# Patient Record
Sex: FEMALE | Race: WHITE | NOT HISPANIC OR LATINO | Employment: OTHER | URBAN - METROPOLITAN AREA
[De-identification: names, ages, dates, MRNs, and addresses within clinical notes are randomized per-mention and may not be internally consistent; named-entity substitution may affect disease eponyms.]

---

## 2023-03-24 ENCOUNTER — TELEPHONE (OUTPATIENT)
Dept: PRIMARY CARE | Facility: CLINIC | Age: 53
End: 2023-03-24
Payer: COMMERCIAL

## 2023-03-27 DIAGNOSIS — J01.10 ACUTE FRONTAL SINUSITIS, RECURRENCE NOT SPECIFIED: Primary | ICD-10-CM

## 2023-03-27 RX ORDER — AZITHROMYCIN 250 MG/1
TABLET, FILM COATED ORAL
Qty: 6 TABLET | Refills: 0 | Status: SHIPPED | OUTPATIENT
Start: 2023-03-27 | End: 2023-04-01

## 2023-04-24 NOTE — PROGRESS NOTES
Subjective   Angelia Shah is a 52 y.o. female who presents for Concerned of Fluctuating blood work   .  HPI  Adore is a 52-year-old female known history of benign essential hypertension type 2 diabetes mellitus diet-controlled, dyslipidemia anxiety Parul-Barr virus patient is here for follow-up, she takes her medication prescribed aspirin 80 mg denies chest pain shortness of breath fever chills nausea vomiting constipation diarrhea concerned fluctuating blood sugar  gained 15 lbs.   Review of Systems  10 system review pertinent as above  Objective     There were no vitals taken for this visit.   Physical Exam    HEENT: Atraumatic normocephalic the pupils are equal and round and reactive to light the sclerae nonicteric extraocular motion are intact.  Neck: Is supple without JVD no carotid bruits the trachea is midline there are no masses pulses are equal and bilateral with normal upstroke.  Skin: Normal.  Skin good texture.  Moist.  Good turgor.  No lesions, no rashes.  Lymph: No lymphadenopathy appreciated, no masses, no lesions  Lungs: Are clear to auscultation and percussion, good breath sounds bilaterally, no rhonchi, no wheezing, good diaphragmatic excursion.  Heart: Normal rate and normal rhythm S1, S2, no S3, no gallop, murmur or rub.  Abdomen: Soft, nontender, no organomegaly, good bowel sounds.    Extremities: Full range of motion, good pulses bilateral.  No cyanosis, no clubbing or edema.  Neuro: Cranial nerves II-XII are grossly intact there is no sensory or motor deficits.  Able to move all extremities.    Assessment/Plan     Type 2 diabetes  Out of control    Fasting blood work    CBC BMP lipids  A1c    Allergic to PCN    GYN  Follow Pap  Mammogram  03/02/23 normal   Colonoscopy , Req given Dr Hyatt    IBS   With constioation  Fluids rest  Linzess    GERD  Stable    Avoid spicy food  Wt loss worked    EBV   Stable    HTN Stable with wt loss    Tobacco abuse declined to quit     Cholesterol  lipid panel    Ankylosis spondylosis  Patient is very active       Problem List Items Addressed This Visit          Endocrine/Metabolic    Type 2 diabetes mellitus without complication, without long-term current use of insulin (CMS/[facility])    Relevant Orders    Basic Metabolic Panel    Hemoglobin A1C       Other    Dyslipidemia - Primary    Relevant Orders    Basic Metabolic Panel    Hemoglobin A1C    Lipid Panel    Tiredness         Terry Perez MD

## 2023-04-26 ENCOUNTER — OFFICE VISIT (OUTPATIENT)
Dept: PRIMARY CARE | Facility: CLINIC | Age: 53
End: 2023-04-26
Payer: COMMERCIAL

## 2023-04-26 VITALS
RESPIRATION RATE: 16 BRPM | BODY MASS INDEX: 25.92 KG/M2 | TEMPERATURE: 97.7 F | DIASTOLIC BLOOD PRESSURE: 84 MMHG | SYSTOLIC BLOOD PRESSURE: 124 MMHG | WEIGHT: 175 LBS | HEIGHT: 69 IN | HEART RATE: 78 BPM

## 2023-04-26 DIAGNOSIS — E11.9 TYPE 2 DIABETES MELLITUS WITHOUT COMPLICATION, WITHOUT LONG-TERM CURRENT USE OF INSULIN (MULTI): ICD-10-CM

## 2023-04-26 DIAGNOSIS — Z72.0 TOBACCO ABUSE: ICD-10-CM

## 2023-04-26 DIAGNOSIS — R53.83 TIREDNESS: ICD-10-CM

## 2023-04-26 DIAGNOSIS — E78.5 DYSLIPIDEMIA: Primary | ICD-10-CM

## 2023-04-26 DIAGNOSIS — Z12.11 SCREENING FOR COLON CANCER: ICD-10-CM

## 2023-04-26 PROCEDURE — 3074F SYST BP LT 130 MM HG: CPT | Performed by: INTERNAL MEDICINE

## 2023-04-26 PROCEDURE — 99214 OFFICE O/P EST MOD 30 MIN: CPT | Performed by: INTERNAL MEDICINE

## 2023-04-26 PROCEDURE — 80048 BASIC METABOLIC PNL TOTAL CA: CPT | Performed by: INTERNAL MEDICINE

## 2023-04-26 PROCEDURE — 80061 LIPID PANEL: CPT | Performed by: INTERNAL MEDICINE

## 2023-04-26 PROCEDURE — 3079F DIAST BP 80-89 MM HG: CPT | Performed by: INTERNAL MEDICINE

## 2023-04-26 PROCEDURE — 4004F PT TOBACCO SCREEN RCVD TLK: CPT | Performed by: INTERNAL MEDICINE

## 2023-04-26 PROCEDURE — 83036 HEMOGLOBIN GLYCOSYLATED A1C: CPT | Performed by: INTERNAL MEDICINE

## 2023-04-26 PROCEDURE — 36415 COLL VENOUS BLD VENIPUNCTURE: CPT | Performed by: INTERNAL MEDICINE

## 2023-04-26 RX ORDER — ROSUVASTATIN CALCIUM 20 MG/1
1 TABLET, COATED ORAL DAILY
COMMUNITY
Start: 2021-06-14 | End: 2023-04-28

## 2023-04-26 RX ORDER — TIRZEPATIDE 5 MG/.5ML
5 INJECTION, SOLUTION SUBCUTANEOUS
Qty: 3 ML | Refills: 2 | Status: SHIPPED | OUTPATIENT
Start: 2023-04-26 | End: 2023-04-28

## 2023-04-26 ASSESSMENT — PAIN SCALES - GENERAL: PAINLEVEL: 0-NO PAIN

## 2023-04-27 DIAGNOSIS — E11.9 TYPE 2 DIABETES MELLITUS WITHOUT COMPLICATION, WITHOUT LONG-TERM CURRENT USE OF INSULIN (MULTI): ICD-10-CM

## 2023-04-27 DIAGNOSIS — E78.5 DYSLIPIDEMIA: Primary | ICD-10-CM

## 2023-04-28 DIAGNOSIS — E11.9 TYPE 2 DIABETES MELLITUS WITHOUT COMPLICATION, WITHOUT LONG-TERM CURRENT USE OF INSULIN (MULTI): ICD-10-CM

## 2023-04-28 RX ORDER — ROSUVASTATIN CALCIUM 20 MG/1
TABLET, COATED ORAL DAILY
Qty: 90 TABLET | Refills: 3 | Status: SHIPPED | OUTPATIENT
Start: 2023-04-28 | End: 2023-05-08 | Stop reason: SDUPTHER

## 2023-04-28 RX ORDER — TIRZEPATIDE 5 MG/.5ML
5 INJECTION, SOLUTION SUBCUTANEOUS
Qty: 3 ML | Refills: 1 | Status: SHIPPED | OUTPATIENT
Start: 2023-04-28 | End: 2023-04-28 | Stop reason: SDUPTHER

## 2023-04-30 RX ORDER — TIRZEPATIDE 5 MG/.5ML
5 INJECTION, SOLUTION SUBCUTANEOUS
Qty: 3 ML | Refills: 1 | Status: SHIPPED | OUTPATIENT
Start: 2023-04-30 | End: 2023-05-08 | Stop reason: SDUPTHER

## 2023-05-08 DIAGNOSIS — E78.5 DYSLIPIDEMIA: ICD-10-CM

## 2023-05-08 DIAGNOSIS — E11.9 TYPE 2 DIABETES MELLITUS WITHOUT COMPLICATION, WITHOUT LONG-TERM CURRENT USE OF INSULIN (MULTI): ICD-10-CM

## 2023-05-08 DIAGNOSIS — Z72.0 TOBACCO ABUSE: Primary | ICD-10-CM

## 2023-05-08 RX ORDER — IBUPROFEN 200 MG
1 TABLET ORAL EVERY 24 HOURS
Qty: 30 PATCH | Refills: 0 | Status: SHIPPED | OUTPATIENT
Start: 2023-05-08 | End: 2023-06-07

## 2023-05-08 RX ORDER — TIRZEPATIDE 5 MG/.5ML
5 INJECTION, SOLUTION SUBCUTANEOUS
Qty: 3 ML | Refills: 1 | Status: SHIPPED | OUTPATIENT
Start: 2023-05-08 | End: 2023-05-09 | Stop reason: SDUPTHER

## 2023-05-08 RX ORDER — ROSUVASTATIN CALCIUM 20 MG/1
20 TABLET, COATED ORAL DAILY
Qty: 90 TABLET | Refills: 3 | Status: SHIPPED | OUTPATIENT
Start: 2023-05-08 | End: 2023-07-07 | Stop reason: SDUPTHER

## 2023-05-08 NOTE — PROGRESS NOTES
I have given refills to Adore for Mounjaro rosuvastatin and NicoDerm patch  Also give refill for her  for NicoDerm patch.

## 2023-05-09 ENCOUNTER — TELEPHONE (OUTPATIENT)
Dept: PRIMARY CARE | Facility: CLINIC | Age: 53
End: 2023-05-09

## 2023-05-09 DIAGNOSIS — E11.9 TYPE 2 DIABETES MELLITUS WITHOUT COMPLICATION, WITHOUT LONG-TERM CURRENT USE OF INSULIN (MULTI): ICD-10-CM

## 2023-05-09 DIAGNOSIS — E78.5 DYSLIPIDEMIA: ICD-10-CM

## 2023-05-09 RX ORDER — DULOXETIN HYDROCHLORIDE 30 MG/1
1 CAPSULE, DELAYED RELEASE ORAL DAILY
COMMUNITY
Start: 2022-08-11 | End: 2023-07-07 | Stop reason: SDUPTHER

## 2023-05-09 RX ORDER — TIRZEPATIDE 5 MG/.5ML
5 INJECTION, SOLUTION SUBCUTANEOUS
Qty: 3 ML | Refills: 1 | Status: SHIPPED | OUTPATIENT
Start: 2023-05-09 | End: 2023-07-07 | Stop reason: ALTCHOICE

## 2023-05-10 RX ORDER — METFORMIN HYDROCHLORIDE 500 MG/1
500 TABLET ORAL
Qty: 60 TABLET | Refills: 11 | Status: SHIPPED | OUTPATIENT
Start: 2023-05-10 | End: 2023-07-07 | Stop reason: SINTOL

## 2023-05-10 NOTE — PROGRESS NOTES
I called patient left a message, insurance instructed patient starts metformin first prior to proceeding with any preauthorization for  Mounjaro.   uti

## 2023-07-07 ENCOUNTER — OFFICE VISIT (OUTPATIENT)
Dept: PRIMARY CARE | Facility: CLINIC | Age: 53
End: 2023-07-07
Payer: COMMERCIAL

## 2023-07-07 VITALS
TEMPERATURE: 98.4 F | RESPIRATION RATE: 15 BRPM | HEART RATE: 74 BPM | WEIGHT: 175 LBS | HEIGHT: 69 IN | BODY MASS INDEX: 25.92 KG/M2 | DIASTOLIC BLOOD PRESSURE: 80 MMHG | SYSTOLIC BLOOD PRESSURE: 120 MMHG

## 2023-07-07 DIAGNOSIS — M79.7 CHRONIC FATIGUE SYNDROME WITH FIBROMYALGIA: ICD-10-CM

## 2023-07-07 DIAGNOSIS — G93.32 CHRONIC FATIGUE SYNDROME WITH FIBROMYALGIA: ICD-10-CM

## 2023-07-07 DIAGNOSIS — E78.5 DYSLIPIDEMIA: ICD-10-CM

## 2023-07-07 DIAGNOSIS — E11.9 TYPE 2 DIABETES MELLITUS WITHOUT COMPLICATION, WITHOUT LONG-TERM CURRENT USE OF INSULIN (MULTI): ICD-10-CM

## 2023-07-07 DIAGNOSIS — B37.31 VAGINAL YEAST INFECTION: ICD-10-CM

## 2023-07-07 DIAGNOSIS — G47.00 INSOMNIA, UNSPECIFIED TYPE: ICD-10-CM

## 2023-07-07 DIAGNOSIS — Z72.0 TOBACCO ABUSE: ICD-10-CM

## 2023-07-07 DIAGNOSIS — E11.9 TYPE 2 DIABETES MELLITUS WITHOUT COMPLICATION, WITHOUT LONG-TERM CURRENT USE OF INSULIN (MULTI): Primary | ICD-10-CM
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
## 2023-07-07 DIAGNOSIS — S00.06XA TICK BITE OF SCALP, INITIAL ENCOUNTER: ICD-10-CM

## 2023-07-07 PROCEDURE — 3079F DIAST BP 80-89 MM HG: CPT | Performed by: INTERNAL MEDICINE

## 2023-07-07 PROCEDURE — 3074F SYST BP LT 130 MM HG: CPT | Performed by: INTERNAL MEDICINE

## 2023-07-07 PROCEDURE — 99214 OFFICE O/P EST MOD 30 MIN: CPT | Performed by: INTERNAL MEDICINE

## 2023-07-07 PROCEDURE — 4004F PT TOBACCO SCREEN RCVD TLK: CPT | Performed by: INTERNAL MEDICINE

## 2023-07-07 PROCEDURE — 85025 COMPLETE CBC W/AUTO DIFF WBC: CPT | Performed by: INTERNAL MEDICINE

## 2023-07-07 PROCEDURE — 87476 LYME DIS DNA AMP PROBE: CPT

## 2023-07-07 PROCEDURE — 80048 BASIC METABOLIC PNL TOTAL CA: CPT | Performed by: INTERNAL MEDICINE

## 2023-07-07 PROCEDURE — 83036 HEMOGLOBIN GLYCOSYLATED A1C: CPT | Performed by: INTERNAL MEDICINE

## 2023-07-07 RX ORDER — FLUCONAZOLE 100 MG/1
100 TABLET ORAL DAILY
Qty: 4 TABLET | Refills: 0 | Status: SHIPPED | OUTPATIENT
Start: 2023-07-07 | End: 2023-07-11

## 2023-07-07 RX ORDER — TRAZODONE HYDROCHLORIDE 50 MG/1
50 TABLET ORAL NIGHTLY PRN
Qty: 30 TABLET | Refills: 0 | Status: SHIPPED | OUTPATIENT
Start: 2023-07-07 | End: 2024-06-03 | Stop reason: WASHOUT

## 2023-07-07 RX ORDER — ROSUVASTATIN CALCIUM 20 MG/1
20 TABLET, COATED ORAL DAILY
Qty: 90 TABLET | Refills: 3 | Status: SHIPPED | OUTPATIENT
Start: 2023-07-07 | End: 2024-05-22

## 2023-07-07 RX ORDER — DOXYCYCLINE 100 MG/1
100 CAPSULE ORAL 2 TIMES DAILY
Qty: 20 CAPSULE | Refills: 0 | Status: SHIPPED | OUTPATIENT
Start: 2023-07-07 | End: 2023-07-17

## 2023-07-07 RX ORDER — TIRZEPATIDE 2.5 MG/.5ML
2.5 INJECTION, SOLUTION SUBCUTANEOUS
Qty: 3 ML | Refills: 0 | Status: SHIPPED | OUTPATIENT
Start: 2023-07-07 | End: 2024-01-22 | Stop reason: SDUPTHER

## 2023-07-07 RX ORDER — TIRZEPATIDE 2.5 MG/.5ML
2.5 INJECTION, SOLUTION SUBCUTANEOUS
Qty: 3 ML | Refills: 0 | Status: SHIPPED | OUTPATIENT
Start: 2023-07-07 | End: 2023-07-07 | Stop reason: ALTCHOICE

## 2023-07-07 RX ORDER — DULOXETIN HYDROCHLORIDE 30 MG/1
30 CAPSULE, DELAYED RELEASE ORAL DAILY
Qty: 30 CAPSULE | Refills: 3 | Status: SHIPPED | OUTPATIENT
Start: 2023-07-07 | End: 2023-08-23 | Stop reason: ALTCHOICE

## 2023-07-07 ASSESSMENT — PAIN SCALES - GENERAL: PAINLEVEL: 0-NO PAIN

## 2023-07-07 NOTE — PROGRESS NOTES
Subjective   Angelia Shah is a 52 y.o. female who presents for patient is here tick bite.  ERICKSON Avitia is a 52-year-old female history of type 2 diabetes anxiety and depression dyslipidemia takes medication prescribed for patient is here complaining of a tick bite. In addition patient is on metformin unable to take due Diarrhea and upset stomach.   Review of Systems  10 system review pertinent as above  Objective     There were no vitals taken for this visit.   Physical Exam  HEENT: Atraumatic normocephalic the pupils are equal and round and reactive to light the sclerae nonicteric extraocular motion are intact.  Neck: Is supple without JVD no carotid bruits the trachea is midline there are no masses pulses are equal and bilateral with normal upstroke.  Skin: Normal.  Skin good texture.  Moist.  Good turgor.  No lesions, no rashes.  Lymph: No lymphadenopathy appreciated, no masses, no lesions  Lungs: Are clear to auscultation and percussion, good breath sounds bilaterally, no rhonchi, no wheezing, good diaphragmatic excursion.  Heart: Normal rate and normal rhythm S1, S2, no S3, no gallop, murmur or rub.  Abdomen: Soft, nontender, no organomegaly, good bowel sounds.    Extremities: Full range of motion, good pulses bilateral.  No cyanosis, no clubbing or edema.  Neuro: Cranial nerves II-XII are grossly intact there is no sensory or motor deficits.  Able to move all extremities.      Assessment/Plan     Patient is here with a tick bite  Two saturdays a go  Left side of head    Continue with the low-fat, low-cholesterol diet,  I recommended Mediterranean diet, which include fish, chicken, vegetables and olive oil  Exercise daily for 30 minutes at least 3 times a week  Continue home medications    Diabetes  Continue your current medications  Remain on a Low carbohydrate diet, exercise daily  Check a sugar before breakfast and before dinner  Failed Metformin due to N/V Diarrhea Abdominal pain  Will try  Mounjaro    Chronic pain syndrome  Doing well on Cymbalta  Stopped will continue    Insomnia tried melatonin  Will try trazodone    Problem List Items Addressed This Visit       Dyslipidemia    Relevant Medications    rosuvastatin (Crestor) 20 mg tablet    Type 2 diabetes mellitus without complication, without long-term current use of insulin (CMS/[facility]) - Primary    Relevant Medications    tirzepatide (Mounjaro) 2.5 mg/0.5 mL pen injector    rosuvastatin (Crestor) 20 mg tablet    Other Relevant Orders    Basic Metabolic Panel    Basic Metabolic Panel    Hemoglobin A1C    Tobacco abuse    Chronic fatigue syndrome with fibromyalgia    Relevant Medications    DULoxetine (Cymbalta) 30 mg DR capsule    Tick bite of scalp    Relevant Medications    doxycycline (Monodox) 100 mg capsule    Other Relevant Orders    Lyme disease, PCR    CBC    Insomnia    Relevant Medications    traZODone (Desyrel) 50 mg tablet    Vaginal yeast infection    Relevant Medications    fluconazole (Diflucan) 100 mg tablet         Terry Perez MD

## 2023-07-12 LAB — LYME DISEASE (BORRELIA BURGDORFERI), PCR: NOT DETECTED

## 2023-08-11 ENCOUNTER — TELEPHONE (OUTPATIENT)
Dept: PRIMARY CARE | Facility: CLINIC | Age: 53
End: 2023-08-11

## 2023-08-21 NOTE — PROGRESS NOTES
Subjective   Angelia Shah is a 52 y.o. female who presents for back pain.  HPI  Angelia is a 52-year-old female chronic fatigue Parul-Barr virus dyslipidemia type 2 diabetes chronic pain syndrome on Cymbalta insomnia on melatonin and trazodone complaint lower back pain history of sciatica pain is radiating to buttock and leg, patient tried over-the-counter medication without success  Review of Systems  10 systems are pertinent as above  Objective     There were no vitals taken for this visit.   Physical Exam  HEENT: Atraumatic normocephalic the pupils are equal and round and reactive to light the sclerae nonicteric extraocular motion are intact.  Neck: Is supple without JVD no carotid bruits the trachea is midline there are no masses pulses are equal and bilateral with normal upstroke.  Skin: Normal.  Skin good texture.  Moist.  Good turgor.  No lesions, no rashes.  Lymph: No lymphadenopathy appreciated, no masses, no lesions  Lungs: Are clear to auscultation and percussion, good breath sounds bilaterally, no rhonchi, no wheezing, good diaphragmatic excursion.  Heart: Normal rate and normal rhythm S1, S2, no S3, no gallop, murmur or rub.  Abdomen: Soft, nontender, no organomegaly, good bowel sounds.    Extremities: Full range of motion, good pulses bilateral.  No cyanosis, no clubbing or edema.  Neuro: Cranial nerves II-XII are grossly intact there is no sensory or motor deficits.  Able to move all extremities.      Assessment/Plan     Here for a follow-up complaining of back pain    Acute sciatica  With lumbar radicular  X-ray LS-spine ordered  [facility]  Physical therapy  Core exercise    Diabetes  Continue your current medications  Remain on a Low carbohydrate diet, exercise daily  Check a sugar before breakfast and before dinner    Low-fat, low-cholesterol diet.  I recommended Mediterranean diet  Including fish, chicken, vegetables and olive oil  Exercise daily for 30 minutes  Continue medications as  prescribed    Otalgia  Cortisporin otic solution  Medrol dos paqck      Problem List Items Addressed This Visit    None        Terry Perez MD

## 2023-08-23 ENCOUNTER — OFFICE VISIT (OUTPATIENT)
Dept: PRIMARY CARE | Facility: CLINIC | Age: 53
End: 2023-08-23
Payer: COMMERCIAL

## 2023-08-23 VITALS — BODY MASS INDEX: 25.84 KG/M2 | HEIGHT: 69 IN

## 2023-08-23 DIAGNOSIS — M54.41 LOW BACK PAIN WITH BILATERAL SCIATICA, UNSPECIFIED BACK PAIN LATERALITY, UNSPECIFIED CHRONICITY: Primary | ICD-10-CM

## 2023-08-23 DIAGNOSIS — H92.03 OTALGIA OF BOTH EARS: ICD-10-CM

## 2023-08-23 DIAGNOSIS — M54.42 LOW BACK PAIN WITH BILATERAL SCIATICA, UNSPECIFIED BACK PAIN LATERALITY, UNSPECIFIED CHRONICITY: Primary | ICD-10-CM

## 2023-08-23 PROCEDURE — 99214 OFFICE O/P EST MOD 30 MIN: CPT | Performed by: INTERNAL MEDICINE

## 2023-08-23 PROCEDURE — 4004F PT TOBACCO SCREEN RCVD TLK: CPT | Performed by: INTERNAL MEDICINE

## 2023-08-23 RX ORDER — NEOMYCIN SULFATE, POLYMYXIN B SULFATE, HYDROCORTISONE 3.5; 10000; 1 MG/ML; [USP'U]/ML; MG/ML
2 SOLUTION/ DROPS AURICULAR (OTIC) 3 TIMES DAILY
Qty: 10 ML | Refills: 0 | Status: SHIPPED | OUTPATIENT
Start: 2023-08-23 | End: 2024-08-22

## 2023-08-23 RX ORDER — METHYLPREDNISOLONE 4 MG/1
TABLET ORAL
Qty: 21 TABLET | Refills: 0 | Status: SHIPPED | OUTPATIENT
Start: 2023-08-23 | End: 2023-08-30

## 2023-10-12 ENCOUNTER — ANCILLARY PROCEDURE (OUTPATIENT)
Dept: RADIOLOGY | Facility: CLINIC | Age: 53
End: 2023-10-12
Payer: COMMERCIAL

## 2023-10-12 DIAGNOSIS — S92.901D UNSPECIFIED FRACTURE OF RIGHT FOOT, SUBSEQUENT ENCOUNTER FOR FRACTURE WITH ROUTINE HEALING: ICD-10-CM

## 2023-10-12 DIAGNOSIS — Z98.890 OTHER SPECIFIED POSTPROCEDURAL STATES: ICD-10-CM

## 2023-10-12 PROCEDURE — 73630 X-RAY EXAM OF FOOT: CPT | Mod: RIGHT SIDE | Performed by: RADIOLOGY

## 2023-10-12 PROCEDURE — 73630 X-RAY EXAM OF FOOT: CPT | Mod: RT,FY

## 2023-12-08 PROCEDURE — 88305 TISSUE EXAM BY PATHOLOGIST: CPT | Performed by: PATHOLOGY

## 2023-12-08 PROCEDURE — 88305 TISSUE EXAM BY PATHOLOGIST: CPT

## 2023-12-11 ENCOUNTER — LAB REQUISITION (OUTPATIENT)
Dept: LAB | Facility: HOSPITAL | Age: 53
End: 2023-12-11
Payer: COMMERCIAL

## 2023-12-11 DIAGNOSIS — N89.8 OTHER SPECIFIED NONINFLAMMATORY DISORDERS OF VAGINA: ICD-10-CM

## 2023-12-13 LAB
LABORATORY COMMENT REPORT: NORMAL
PATH REPORT.FINAL DX SPEC: NORMAL
PATH REPORT.GROSS SPEC: NORMAL
PATH REPORT.RELEVANT HX SPEC: NORMAL
PATH REPORT.TOTAL CANCER: NORMAL

## 2023-12-27 ENCOUNTER — HOSPITAL ENCOUNTER (OUTPATIENT)
Dept: RADIOLOGY | Facility: HOSPITAL | Age: 53
Discharge: HOME | End: 2023-12-27
Payer: COMMERCIAL

## 2023-12-27 DIAGNOSIS — R23.4 CHANGES IN SKIN TEXTURE: ICD-10-CM

## 2023-12-27 PROCEDURE — 77066 DX MAMMO INCL CAD BI: CPT | Performed by: RADIOLOGY

## 2023-12-27 PROCEDURE — 76642 ULTRASOUND BREAST LIMITED: CPT | Performed by: RADIOLOGY

## 2023-12-27 PROCEDURE — 76642 ULTRASOUND BREAST LIMITED: CPT | Mod: 50

## 2023-12-27 PROCEDURE — 77066 DX MAMMO INCL CAD BI: CPT

## 2023-12-27 PROCEDURE — 77062 BREAST TOMOSYNTHESIS BI: CPT | Performed by: RADIOLOGY

## 2024-01-22 ENCOUNTER — TELEPHONE (OUTPATIENT)
Dept: PRIMARY CARE | Facility: CLINIC | Age: 54
End: 2024-01-22
Payer: COMMERCIAL

## 2024-01-22 DIAGNOSIS — E11.9 TYPE 2 DIABETES MELLITUS WITHOUT COMPLICATION, WITHOUT LONG-TERM CURRENT USE OF INSULIN (MULTI): ICD-10-CM

## 2024-01-22 RX ORDER — TIRZEPATIDE 2.5 MG/.5ML
2.5 INJECTION, SOLUTION SUBCUTANEOUS
Qty: 6 ML | Refills: 3 | Status: SHIPPED | OUTPATIENT
Start: 2024-01-22 | End: 2024-02-20 | Stop reason: ALTCHOICE

## 2024-01-22 RX ORDER — TIRZEPATIDE 2.5 MG/.5ML
2.5 INJECTION, SOLUTION SUBCUTANEOUS
Qty: 3 ML | Refills: 0 | Status: SHIPPED | OUTPATIENT
Start: 2024-01-22 | End: 2024-02-20 | Stop reason: ALTCHOICE

## 2024-01-22 NOTE — TELEPHONE ENCOUNTER
Pt states that she spoke with her insurance company and she was told Mounjaro has been approved for her, she need a 90 day supple to go to the [facility] and a 30 day supply to go to her [facility]

## 2024-02-14 NOTE — PROGRESS NOTES
Subjective   Angelia Shah is a 53 y.o. female who presents for back pain.  HPI  Angelia is a 52-year-old female chronic fatigue Parul-Barr virus dyslipidemia type 2 diabetes chronic pain syndrome on Cymbalta insomnia on melatonin and trazodone complaint lower back pain history of sciatica pain is radiating to buttock and leg, patient tried over-the-counter medication without success Patient couldn't tolerate Metformin due to diarrhea.  Foot pain severe lateral was seen by podiatry and wants a second opinion.   Review of Systems  10 systems are pertinent as above  Objective     Visit Vitals  /80   Pulse 72   Temp 36.3 °C (97.3 °F)   Resp 16      Physical Exam  HEENT: Atraumatic normocephalic the pupils are equal and round and reactive to light the sclerae nonicteric extraocular motion are intact.  Neck: Is supple without JVD no carotid bruits the trachea is midline there are no masses pulses are equal and bilateral with normal upstroke.  Skin: Normal.  Skin good texture.  Moist.  Good turgor.  No lesions, no rashes.  Lymph: No lymphadenopathy appreciated, no masses, no lesions  Lungs: Are clear to auscultation and percussion, good breath sounds bilaterally, no rhonchi, no wheezing, good diaphragmatic excursion.  Heart: Normal rate and normal rhythm S1, S2, no S3, no gallop, murmur or rub.  Abdomen: Soft, nontender, no organomegaly, good bowel sounds.    Extremities: Full range of motion, good pulses bilateral.  No cyanosis, no clubbing or edema.  Neuro: Cranial nerves II-XII are grossly intact there is no sensory or motor deficits.  Able to move all extremities.      Assessment/Plan     Physical therapy    CBC BMP LIPID AST ALT a1c    Colonoscopy declined wants Cologurd  Mammogram  GYN Dr Pelaez      Here for a follow-up complaining of back pain    Acute sciatica  With lumbar radicular  X-ray LS-spine ordered  Medrol Dosepak  Physical therapy  Core exercise    Diabetes  Continue your current  medications  Remain on a Low carbohydrate diet, exercise daily  Check a sugar before breakfast and before dinner    Low-fat, low-cholesterol diet.  I recommended Mediterranean diet  Including fish, chicken, vegetables and olive oil  Exercise daily for 30 minutes  Continue medications as prescribed    Otalgia  Cortisporin otic solution  Medrol dos paqck      Problem List Items Addressed This Visit       Dyslipidemia    Relevant Orders    Lipid Panel    AST    ALT    Type 2 diabetes mellitus without complication, without long-term current use of insulin (CMS/[facility])    Relevant Medications    tirzepatide (Mounjaro) 5 mg/0.5 mL pen injector    Other Relevant Orders    Basic Metabolic Panel    Hemoglobin A1C    Benign essential HTN - Primary    Fibromyalgia    Relevant Orders    CBC    IBS (irritable bowel syndrome)    Acute right ankle pain    Relevant Orders    Referral to Orthopaedic Surgery     Other Visit Diagnoses       Colon cancer screening        Relevant Orders    Cologuard® colon cancer screening              Terry Perez MD

## 2024-02-20 ENCOUNTER — OFFICE VISIT (OUTPATIENT)
Dept: PRIMARY CARE | Facility: CLINIC | Age: 54
End: 2024-02-20
Payer: COMMERCIAL

## 2024-02-20 VITALS
HEART RATE: 72 BPM | TEMPERATURE: 97.3 F | RESPIRATION RATE: 16 BRPM | BODY MASS INDEX: 25.92 KG/M2 | SYSTOLIC BLOOD PRESSURE: 120 MMHG | HEIGHT: 69 IN | DIASTOLIC BLOOD PRESSURE: 80 MMHG | WEIGHT: 175 LBS

## 2024-02-20 DIAGNOSIS — E78.5 DYSLIPIDEMIA: ICD-10-CM

## 2024-02-20 DIAGNOSIS — M79.7 FIBROMYALGIA: ICD-10-CM

## 2024-02-20 DIAGNOSIS — E11.9 TYPE 2 DIABETES MELLITUS WITHOUT COMPLICATION, WITHOUT LONG-TERM CURRENT USE OF INSULIN (MULTI): ICD-10-CM

## 2024-02-20 DIAGNOSIS — Z12.11 COLON CANCER SCREENING: ICD-10-CM

## 2024-02-20 DIAGNOSIS — K58.9 IRRITABLE BOWEL SYNDROME, UNSPECIFIED TYPE: ICD-10-CM

## 2024-02-20 DIAGNOSIS — M25.571 ACUTE RIGHT ANKLE PAIN: ICD-10-CM

## 2024-02-20 DIAGNOSIS — I10 BENIGN ESSENTIAL HTN: Primary | ICD-10-CM

## 2024-02-20 PROCEDURE — 85025 COMPLETE CBC W/AUTO DIFF WBC: CPT | Performed by: INTERNAL MEDICINE

## 2024-02-20 PROCEDURE — 80061 LIPID PANEL: CPT | Performed by: INTERNAL MEDICINE

## 2024-02-20 PROCEDURE — 3074F SYST BP LT 130 MM HG: CPT | Performed by: INTERNAL MEDICINE

## 2024-02-20 PROCEDURE — 84460 ALANINE AMINO (ALT) (SGPT): CPT | Performed by: INTERNAL MEDICINE

## 2024-02-20 PROCEDURE — 84450 TRANSFERASE (AST) (SGOT): CPT | Performed by: INTERNAL MEDICINE

## 2024-02-20 PROCEDURE — 83036 HEMOGLOBIN GLYCOSYLATED A1C: CPT | Performed by: INTERNAL MEDICINE

## 2024-02-20 PROCEDURE — 99396 PREV VISIT EST AGE 40-64: CPT | Performed by: INTERNAL MEDICINE

## 2024-02-20 PROCEDURE — 3079F DIAST BP 80-89 MM HG: CPT | Performed by: INTERNAL MEDICINE

## 2024-02-20 PROCEDURE — 80048 BASIC METABOLIC PNL TOTAL CA: CPT | Performed by: INTERNAL MEDICINE

## 2024-02-20 RX ORDER — TIRZEPATIDE 5 MG/.5ML
5 INJECTION, SOLUTION SUBCUTANEOUS
Qty: 2 ML | Refills: 2 | Status: SHIPPED | OUTPATIENT
Start: 2024-02-20 | End: 2024-06-01

## 2024-02-20 ASSESSMENT — ENCOUNTER SYMPTOMS
DEPRESSION: 0
LOSS OF SENSATION IN FEET: 0
OCCASIONAL FEELINGS OF UNSTEADINESS: 0

## 2024-02-20 ASSESSMENT — PAIN SCALES - GENERAL: PAINLEVEL: 6

## 2024-02-22 ENCOUNTER — APPOINTMENT (OUTPATIENT)
Dept: PRIMARY CARE | Facility: CLINIC | Age: 54
End: 2024-02-22
Payer: COMMERCIAL

## 2024-03-02 LAB — NONINV COLON CA DNA+OCC BLD SCRN STL QL: NEGATIVE

## 2024-05-21 ENCOUNTER — OFFICE VISIT (OUTPATIENT)
Dept: PRIMARY CARE | Facility: CLINIC | Age: 54
End: 2024-05-21
Payer: COMMERCIAL

## 2024-05-21 VITALS — WEIGHT: 166 LBS | BODY MASS INDEX: 26.06 KG/M2 | HEIGHT: 67 IN | TEMPERATURE: 97.1 F

## 2024-05-21 DIAGNOSIS — B37.31 VAGINAL YEAST INFECTION: ICD-10-CM
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
## 2024-05-21 DIAGNOSIS — L03.012 CELLULITIS OF FINGER OF LEFT HAND: Primary | ICD-10-CM

## 2024-05-21 PROCEDURE — 90471 IMMUNIZATION ADMIN: CPT | Performed by: PHYSICIAN ASSISTANT

## 2024-05-21 PROCEDURE — 4004F PT TOBACCO SCREEN RCVD TLK: CPT | Performed by: PHYSICIAN ASSISTANT

## 2024-05-21 PROCEDURE — 90715 TDAP VACCINE 7 YRS/> IM: CPT | Performed by: PHYSICIAN ASSISTANT

## 2024-05-21 PROCEDURE — 99213 OFFICE O/P EST LOW 20 MIN: CPT | Performed by: PHYSICIAN ASSISTANT

## 2024-05-21 RX ORDER — DOXYCYCLINE 100 MG/1
100 CAPSULE ORAL 2 TIMES DAILY
Qty: 28 CAPSULE | Refills: 0 | Status: SHIPPED | OUTPATIENT
Start: 2024-05-21 | End: 2024-06-04

## 2024-05-21 RX ORDER — FLUCONAZOLE 150 MG/1
TABLET ORAL
Qty: 2 TABLET | Refills: 0 | Status: SHIPPED | OUTPATIENT
Start: 2024-05-21

## 2024-05-21 ASSESSMENT — ENCOUNTER SYMPTOMS
FREQUENCY: 0
TREMORS: 0
ANAL BLEEDING: 0
CONFUSION: 0
JOINT SWELLING: 0
POLYPHAGIA: 0
APPETITE CHANGE: 0
EYE PAIN: 0
DIARRHEA: 0
NUMBNESS: 0
POLYDIPSIA: 0
FEVER: 0
MYALGIAS: 0
SHORTNESS OF BREATH: 0
PALPITATIONS: 0
ABDOMINAL PAIN: 0
DIZZINESS: 0
ARTHRALGIAS: 0
WEAKNESS: 0
COUGH: 0
CONSTIPATION: 0
NERVOUS/ANXIOUS: 0
COLOR CHANGE: 0
CHILLS: 0
NAUSEA: 0
VOMITING: 0
HEMATURIA: 0
EYE DISCHARGE: 0
WHEEZING: 0
SORE THROAT: 0
CHEST TIGHTNESS: 0
FATIGUE: 0
DIFFICULTY URINATING: 0
FACIAL SWELLING: 0
HEADACHES: 0
CHOKING: 0
ABDOMINAL DISTENTION: 0
SLEEP DISTURBANCE: 0

## 2024-05-21 ASSESSMENT — PAIN SCALES - GENERAL: PAINLEVEL: 10-WORST PAIN EVER

## 2024-05-21 NOTE — PROGRESS NOTES
"Subjective   Patient ID: Angelia Shah is a 53 y.o. female with a history of  chronic fatigue Parul-Barr virus dyslipidemia type 2 diabetes chronic pain syndrome, insomnia who presents for Animal Bite (Bird/Onset:  today/Onsite: Left Index Finger).    HPI the patient was bit by macaw bird this morning. Her left index finger is swollen and red.  She reports a lot of pain.  Denies fever or chills.  No  Review of Systems   Constitutional:  Negative for appetite change, chills, fatigue and fever.   HENT:  Negative for congestion, ear pain, facial swelling, hearing loss, nosebleeds and sore throat.    Eyes:  Negative for pain, discharge and visual disturbance.   Respiratory:  Negative for cough, choking, chest tightness, shortness of breath and wheezing.    Cardiovascular:  Negative for chest pain, palpitations and leg swelling.   Gastrointestinal:  Negative for abdominal distention, abdominal pain, anal bleeding, constipation, diarrhea, nausea and vomiting.   Endocrine: Negative for cold intolerance, heat intolerance, polydipsia, polyphagia and polyuria.   Genitourinary:  Negative for difficulty urinating, frequency, hematuria and urgency.   Musculoskeletal:  Negative for arthralgias, gait problem, joint swelling and myalgias.   Skin:  Negative for color change and rash.        Bird bite.  Cellulitis   Neurological:  Negative for dizziness, tremors, syncope, weakness, numbness and headaches.   Psychiatric/Behavioral:  Negative for behavioral problems, confusion, sleep disturbance and suicidal ideas. The patient is not nervous/anxious.        Objective   Temp 36.2 °C (97.1 °F) (Temporal)   Ht 1.702 m (5' 7\")   Wt 75.3 kg (166 lb)   Breastfeeding No   BMI 26.00 kg/m²     Physical Exam  Constitutional:       General: She is not in acute distress.     Appearance: Normal appearance.   HENT:      Head: Normocephalic and atraumatic.      Nose: Nose normal.   Eyes:      Extraocular Movements: Extraocular movements " intact.      Conjunctiva/sclera: Conjunctivae normal.      Pupils: Pupils are equal, round, and reactive to light.   Cardiovascular:      Rate and Rhythm: Normal rate and regular rhythm.      Pulses: Normal pulses.      Heart sounds: Normal heart sounds.   Pulmonary:      Effort: Pulmonary effort is normal.      Breath sounds: Normal breath sounds.   Abdominal:      General: Bowel sounds are normal.      Palpations: Abdomen is soft.   Musculoskeletal:         General: Normal range of motion.      Cervical back: Normal range of motion and neck supple.   Skin:     Comments: Cellulitis of left index finger   Neurological:      General: No focal deficit present.      Mental Status: She is alert and oriented to person, place, and time.   Psychiatric:         Mood and Affect: Mood normal.         Behavior: Behavior normal.         Thought Content: Thought content normal.         Judgment: Judgment normal.         Assessment/Plan     Left index finger cellulitis from a bird bite  Allergic to penicillin  Start doxycycline 100 mg twice daily for 10 days  Apply ice  Take Tylenol or ibuprofen as needed for pain  No swimming in the pool until healed

## 2024-05-22 DIAGNOSIS — E78.5 DYSLIPIDEMIA: ICD-10-CM

## 2024-05-22 DIAGNOSIS — E11.9 TYPE 2 DIABETES MELLITUS WITHOUT COMPLICATION, WITHOUT LONG-TERM CURRENT USE OF INSULIN (MULTI): ICD-10-CM

## 2024-05-22 RX ORDER — ROSUVASTATIN CALCIUM 20 MG/1
20 TABLET, COATED ORAL DAILY
Qty: 90 TABLET | Refills: 3 | Status: SHIPPED | OUTPATIENT
Start: 2024-05-22 | End: 2025-05-22

## 2024-05-24 ENCOUNTER — APPOINTMENT (OUTPATIENT)
Dept: RADIOLOGY | Facility: HOSPITAL | Age: 54
End: 2024-05-24
Payer: COMMERCIAL

## 2024-05-29 NOTE — PROGRESS NOTES
Subjective   Angelia Shah is a 53 y.o. female who presents for tiredness fatigue.   HPI  Angelia is a 53-year-old female chronic fatigue Parul-Barr virus dyslipidemia type 2 diabetes chronic pain syndrome on Cymbalta insomnia on melatonin last she was given some trazodone which is completed, she denies chest pain shortness of breath fever chill nausea vomiting constipation diarrhea dysuria urgency frequency.  Complaining of day fatigue.  She denies fever chills cough denies melena hematochezia epistaxis.  Review of Systems  10 systems are pertinent as above  Objective     Visit Vitals  /82   Pulse 78   Temp 36.5 °C (97.7 °F)   Resp 16        Physical Exam  HEENT: Atraumatic normocephalic the pupils are equal and round and reactive to light the sclerae nonicteric extraocular motion are intact.  Neck: Is supple without JVD no carotid bruits the trachea is midline there are no masses pulses are equal and bilateral with normal upstroke.  Skin: Normal.  Skin good texture.  Moist.  Good turgor.  No lesions, no rashes.  Lymph: No lymphadenopathy appreciated, no masses, no lesions  Lungs: Are clear to auscultation and percussion, good breath sounds bilaterally, no rhonchi, no wheezing, good diaphragmatic excursion.  Heart: Normal rate and normal rhythm S1, S2, no S3, no gallop, murmur or rub.  Abdomen: Soft, nontender, no organomegaly, good bowel sounds.    Extremities: Full range of motion, good pulses bilateral.  No cyanosis, no clubbing or edema.  Neuro: Cranial nerves II-XII are grossly intact there is no sensory or motor deficits.  Able to move all extremities.      Assessment/Plan     Complaining of tiredness  Poor sleep we spoke of sleep hygene  Home sleep study    Reviewed blood work from February 20, 2024    Normal A1c normal glucose     we will check a TSH, magnesium and CBC    Colonoscopy declined wants Cologurd  Mammogram  GYN Dr Pelaez      Here for a follow-up complaining of back pain    Acute  sciatica  With lumbar radicular  X-ray LS-spine ordered  Medrol Dosepak  Physical therapy  Core exercise    Diabetes  Continue your current medications  Remain on a Low carbohydrate diet, exercise daily  Check a sugar before breakfast and before dinner    Low-fat, low-cholesterol diet.  I recommended Mediterranean diet  Including fish, chicken, vegetables and olive oil  Exercise daily for 30 minutes  Continue medications as prescribed    Otalgia  Cortisporin otic solution  Medrol dos paqck      Problem List Items Addressed This Visit       Type 2 diabetes mellitus without complication, without long-term current use of insulin (Multi)    Relevant Medications    tirzepatide (Mounjaro) 5 mg/0.5 mL pen injector    Insomnia - Primary    Relevant Medications    mirtazapine (Remeron) 7.5 mg tablet           Terry Perez MD

## 2024-05-31 DIAGNOSIS — E11.9 TYPE 2 DIABETES MELLITUS WITHOUT COMPLICATION, WITHOUT LONG-TERM CURRENT USE OF INSULIN (MULTI): ICD-10-CM

## 2024-06-01 RX ORDER — TIRZEPATIDE 5 MG/.5ML
5 INJECTION, SOLUTION SUBCUTANEOUS
Qty: 2 ML | Refills: 3 | Status: SHIPPED | OUTPATIENT
Start: 2024-06-02 | End: 2024-06-03 | Stop reason: SDUPTHER

## 2024-06-03 ENCOUNTER — OFFICE VISIT (OUTPATIENT)
Dept: PRIMARY CARE | Facility: CLINIC | Age: 54
End: 2024-06-03
Payer: COMMERCIAL

## 2024-06-03 VITALS
DIASTOLIC BLOOD PRESSURE: 82 MMHG | WEIGHT: 163 LBS | TEMPERATURE: 97.7 F | SYSTOLIC BLOOD PRESSURE: 120 MMHG | RESPIRATION RATE: 16 BRPM | HEIGHT: 67 IN | HEART RATE: 78 BPM | BODY MASS INDEX: 25.58 KG/M2

## 2024-06-03 DIAGNOSIS — M79.7 FIBROMYALGIA: ICD-10-CM

## 2024-06-03 DIAGNOSIS — F51.01 PRIMARY INSOMNIA: Primary | ICD-10-CM

## 2024-06-03 DIAGNOSIS — E11.9 TYPE 2 DIABETES MELLITUS WITHOUT COMPLICATION, WITHOUT LONG-TERM CURRENT USE OF INSULIN (MULTI): ICD-10-CM

## 2024-06-03 DIAGNOSIS — E78.5 DYSLIPIDEMIA: ICD-10-CM

## 2024-06-03 PROCEDURE — 3074F SYST BP LT 130 MM HG: CPT | Performed by: INTERNAL MEDICINE

## 2024-06-03 PROCEDURE — 4004F PT TOBACCO SCREEN RCVD TLK: CPT | Performed by: INTERNAL MEDICINE

## 2024-06-03 PROCEDURE — 3079F DIAST BP 80-89 MM HG: CPT | Performed by: INTERNAL MEDICINE

## 2024-06-03 PROCEDURE — 99214 OFFICE O/P EST MOD 30 MIN: CPT | Performed by: INTERNAL MEDICINE

## 2024-06-03 RX ORDER — MIRTAZAPINE 7.5 MG/1
7.5 TABLET, FILM COATED ORAL NIGHTLY
Qty: 30 TABLET | Refills: 2 | Status: SHIPPED | OUTPATIENT
Start: 2024-06-03 | End: 2024-09-01

## 2024-06-03 RX ORDER — TIRZEPATIDE 5 MG/.5ML
5 INJECTION, SOLUTION SUBCUTANEOUS
Qty: 6 ML | Refills: 3 | Status: SHIPPED | OUTPATIENT
Start: 2024-06-03 | End: 2025-06-03

## 2024-06-03 ASSESSMENT — PAIN SCALES - GENERAL: PAINLEVEL: 4

## 2024-06-03 ASSESSMENT — ENCOUNTER SYMPTOMS
LOSS OF SENSATION IN FEET: 0
OCCASIONAL FEELINGS OF UNSTEADINESS: 0
DEPRESSION: 0

## 2024-09-20 ENCOUNTER — APPOINTMENT (OUTPATIENT)
Dept: RADIOLOGY | Facility: HOSPITAL | Age: 54
End: 2024-09-20
Payer: COMMERCIAL

## 2024-10-15 NOTE — PROGRESS NOTES
Subjective   Angelia Shah is a 54 y.o. female who presents for tiredness fatigue.   HPI  Angelia is a 53-year-old female chronic fatigue Parul-Barr virus dyslipidemia type 2 diabetes chronic pain syndrome on Cymbalta insomnia on melatonin last she was given some trazodone which is completed, she denies chest pain shortness of breath fever chill nausea vomiting constipation diarrhea dysuria urgency frequency.  Complaining of day fatigue.  She denies fever chills cough denies melena hematochezia epistaxis.  Review of Systems  10 systems are pertinent as above  Objective     Visit Vitals  /78   Temp 36 °C (96.8 °F) (Temporal)   Resp 14          Physical Exam  HEENT: Atraumatic normocephalic the pupils are equal and round and reactive to light the sclerae nonicteric extraocular motion are intact.  Neck: Is supple without JVD no carotid bruits the trachea is midline there are no masses pulses are equal and bilateral with normal upstroke.  Skin: Normal.  Skin good texture.  Moist.  Good turgor.  No lesions, no rashes.  Lymph: No lymphadenopathy appreciated, no masses, no lesions  Lungs: Are clear to auscultation and percussion, good breath sounds bilaterally, no rhonchi, no wheezing, good diaphragmatic excursion.  Heart: Normal rate and normal rhythm S1, S2, no S3, no gallop, murmur or rub.  Abdomen: Soft, nontender, no organomegaly, good bowel sounds.    Extremities: Full range of motion, good pulses bilateral.  No cyanosis, no clubbing or edema.  Neuro: Cranial nerves II-XII are grossly intact there is no sensory or motor deficits.  Able to move all extremities.      Assessment/Plan     Fasting Blood test    Planning foot surgery 01/2025 extensive    BMP, LIPID, AST ALT, A1c    Re-Reviewed blood work from February 20, 2024        Colonoscopy declined, Cologurd Neg 02/23/24  Mammogram  GYN Dr Pelaez 12/2024    Immunization  Flu vaccine 10/18/2024  Pneumovax age 67  Shingles vaccine      Here for a follow-up  complaining of back pain    Acute sciatica  With lumbar radicular  X-ray LS-spine ordered  Medrol Dosepak  Physical therapy  Core exercise    Diabetes type 2  Continue your current medications  Remain on a Low carbohydrate diet, exercise daily  Check a sugar before breakfast and before dinner  A1c    Low-fat, low-cholesterol diet.  I recommended Mediterranean diet  Including fish, chicken, vegetables and olive oil  Exercise daily for 30 minutes  Continue medications as prescribed    Otalgia  Cortisporin otic solution  Medrol dos paqck      Problem List Items Addressed This Visit       Dyslipidemia - Primary    Relevant Orders    Lipid Panel    AST    ALT    Type 2 diabetes mellitus without complication, without long-term current use of insulin (Multi)    Relevant Medications    tirzepatide (Mounjaro) 5 mg/0.5 mL pen injector (Start on 10/20/2024)    Other Relevant Orders    Basic Metabolic Panel    Hemoglobin A1C    Benign essential HTN    Relevant Orders    Basic Metabolic Panel    Fibromyalgia    IBS (irritable bowel syndrome)     Other Visit Diagnoses       Right foot pain        Relevant Orders    Referral to Orthopaedic Surgery                  Terry Perez MD

## 2024-10-18 ENCOUNTER — OFFICE VISIT (OUTPATIENT)
Dept: PRIMARY CARE | Facility: CLINIC | Age: 54
End: 2024-10-18
Payer: COMMERCIAL

## 2024-10-18 ENCOUNTER — APPOINTMENT (OUTPATIENT)
Dept: PRIMARY CARE | Facility: CLINIC | Age: 54
End: 2024-10-18
Payer: COMMERCIAL

## 2024-10-18 VITALS
WEIGHT: 150 LBS | BODY MASS INDEX: 23.54 KG/M2 | DIASTOLIC BLOOD PRESSURE: 78 MMHG | SYSTOLIC BLOOD PRESSURE: 120 MMHG | TEMPERATURE: 96.8 F | HEIGHT: 67 IN | RESPIRATION RATE: 14 BRPM

## 2024-10-18 DIAGNOSIS — I73.9 PAD (PERIPHERAL ARTERY DISEASE) (CMS-HCC): ICD-10-CM

## 2024-10-18 DIAGNOSIS — I10 BENIGN ESSENTIAL HTN: ICD-10-CM

## 2024-10-18 DIAGNOSIS — E11.9 TYPE 2 DIABETES MELLITUS WITHOUT COMPLICATION, WITHOUT LONG-TERM CURRENT USE OF INSULIN (MULTI): ICD-10-CM

## 2024-10-18 DIAGNOSIS — Z23 NEEDS FLU SHOT: ICD-10-CM

## 2024-10-18 DIAGNOSIS — K58.9 IRRITABLE BOWEL SYNDROME, UNSPECIFIED TYPE: ICD-10-CM

## 2024-10-18 DIAGNOSIS — M79.7 FIBROMYALGIA: ICD-10-CM

## 2024-10-18 DIAGNOSIS — M79.671 RIGHT FOOT PAIN: ICD-10-CM

## 2024-10-18 DIAGNOSIS — E78.5 DYSLIPIDEMIA: Primary | ICD-10-CM

## 2024-10-18 LAB
ALT SERPL W P-5'-P-CCNC: 25 U/L (ref 16–63)
ANION GAP SERPL CALC-SCNC: 12 MMOL/L (ref 10–20)
AST SERPL W P-5'-P-CCNC: 13 U/L (ref 15–37)
BUN SERPL-MCNC: 20 MG/DL (ref 7–18)
CALCIUM SERPL-MCNC: 9.5 MG/DL (ref 8.5–10.1)
CHLORIDE SERPL-SCNC: 103 MMOL/L (ref 98–107)
CHOLEST SERPL-MCNC: 224 MG/DL (ref 0–199)
CHOLESTEROL/HDL RATIO: 3.4 (ref 4.2–7)
CO2 SERPL-SCNC: 29 MMOL/L (ref 21–32)
CREAT SERPL-MCNC: 0.75 MG/DL (ref 0.6–1.1)
EGFRCR SERPLBLD CKD-EPI 2021: >90 ML/MIN/1.73M*2
GLUCOSE SERPL-MCNC: 91 MG/DL (ref 74–100)
HBA1C MFR BLD: 5.3 %
HDLC SERPL-MCNC: 66 MG/DL (ref 40–59)
IS PATIENT FASTING: YES
LDLC SERPL DIRECT ASSAY-MCNC: 140 MG/DL (ref 0–100)
POTASSIUM SERPL-SCNC: 5.3 MMOL/L (ref 3.5–5.1)
SODIUM SERPL-SCNC: 139 MMOL/L (ref 136–145)
TRIGL SERPL-MCNC: 82 MG/DL

## 2024-10-18 PROCEDURE — 80048 BASIC METABOLIC PNL TOTAL CA: CPT | Performed by: INTERNAL MEDICINE

## 2024-10-18 PROCEDURE — 3008F BODY MASS INDEX DOCD: CPT | Performed by: INTERNAL MEDICINE

## 2024-10-18 PROCEDURE — 84450 TRANSFERASE (AST) (SGOT): CPT | Performed by: INTERNAL MEDICINE

## 2024-10-18 PROCEDURE — 83036 HEMOGLOBIN GLYCOSYLATED A1C: CPT | Performed by: INTERNAL MEDICINE

## 2024-10-18 PROCEDURE — 84460 ALANINE AMINO (ALT) (SGPT): CPT | Performed by: INTERNAL MEDICINE

## 2024-10-18 PROCEDURE — 90471 IMMUNIZATION ADMIN: CPT | Performed by: INTERNAL MEDICINE

## 2024-10-18 PROCEDURE — 3050F LDL-C >= 130 MG/DL: CPT | Performed by: INTERNAL MEDICINE

## 2024-10-18 PROCEDURE — 90656 IIV3 VACC NO PRSV 0.5 ML IM: CPT | Performed by: INTERNAL MEDICINE

## 2024-10-18 PROCEDURE — 3044F HG A1C LEVEL LT 7.0%: CPT | Performed by: INTERNAL MEDICINE

## 2024-10-18 PROCEDURE — 3074F SYST BP LT 130 MM HG: CPT | Performed by: INTERNAL MEDICINE

## 2024-10-18 PROCEDURE — 4004F PT TOBACCO SCREEN RCVD TLK: CPT | Performed by: INTERNAL MEDICINE

## 2024-10-18 PROCEDURE — 3078F DIAST BP <80 MM HG: CPT | Performed by: INTERNAL MEDICINE

## 2024-10-18 PROCEDURE — 99214 OFFICE O/P EST MOD 30 MIN: CPT | Performed by: INTERNAL MEDICINE

## 2024-10-18 RX ORDER — TIRZEPATIDE 5 MG/.5ML
5 INJECTION, SOLUTION SUBCUTANEOUS
Qty: 6 ML | Refills: 3 | Status: SHIPPED | OUTPATIENT
Start: 2024-10-20 | End: 2025-09-21

## 2024-10-18 ASSESSMENT — ENCOUNTER SYMPTOMS
DEPRESSION: 0
OCCASIONAL FEELINGS OF UNSTEADINESS: 0
LOSS OF SENSATION IN FEET: 1

## 2024-10-18 ASSESSMENT — PAIN SCALES - GENERAL: PAINLEVEL_OUTOF10: 0-NO PAIN

## 2024-10-24 ENCOUNTER — HOSPITAL ENCOUNTER (OUTPATIENT)
Dept: VASCULAR MEDICINE | Facility: HOSPITAL | Age: 54
Discharge: HOME | End: 2024-10-24
Payer: COMMERCIAL

## 2024-10-24 ENCOUNTER — APPOINTMENT (OUTPATIENT)
Dept: CARDIOLOGY | Facility: HOSPITAL | Age: 54
End: 2024-10-24
Payer: COMMERCIAL

## 2024-10-24 DIAGNOSIS — I73.9 PAD (PERIPHERAL ARTERY DISEASE) (CMS-HCC): ICD-10-CM

## 2024-11-27 ENCOUNTER — OFFICE VISIT (OUTPATIENT)
Dept: CARDIOLOGY | Facility: CLINIC | Age: 54
End: 2024-11-27
Payer: COMMERCIAL

## 2024-11-27 VITALS
HEART RATE: 71 BPM | OXYGEN SATURATION: 99 % | BODY MASS INDEX: 23.81 KG/M2 | DIASTOLIC BLOOD PRESSURE: 78 MMHG | WEIGHT: 152 LBS | SYSTOLIC BLOOD PRESSURE: 140 MMHG

## 2024-11-27 DIAGNOSIS — E78.5 DYSLIPIDEMIA: ICD-10-CM

## 2024-11-27 DIAGNOSIS — Z01.810 PREOPERATIVE CARDIOVASCULAR EXAMINATION: ICD-10-CM

## 2024-11-27 DIAGNOSIS — I73.9 PERIPHERAL VASCULAR DISEASE (CMS-HCC): Primary | ICD-10-CM

## 2024-11-27 PROCEDURE — 93005 ELECTROCARDIOGRAM TRACING: CPT | Performed by: INTERNAL MEDICINE

## 2024-11-27 PROCEDURE — 4004F PT TOBACCO SCREEN RCVD TLK: CPT | Performed by: INTERNAL MEDICINE

## 2024-11-27 PROCEDURE — 93010 ELECTROCARDIOGRAM REPORT: CPT | Performed by: INTERNAL MEDICINE

## 2024-11-27 PROCEDURE — 3050F LDL-C >= 130 MG/DL: CPT | Performed by: INTERNAL MEDICINE

## 2024-11-27 PROCEDURE — 3078F DIAST BP <80 MM HG: CPT | Performed by: INTERNAL MEDICINE

## 2024-11-27 PROCEDURE — 99214 OFFICE O/P EST MOD 30 MIN: CPT | Performed by: INTERNAL MEDICINE

## 2024-11-27 PROCEDURE — 3044F HG A1C LEVEL LT 7.0%: CPT | Performed by: INTERNAL MEDICINE

## 2024-11-27 PROCEDURE — 3077F SYST BP >= 140 MM HG: CPT | Performed by: INTERNAL MEDICINE

## 2024-11-27 PROCEDURE — 99204 OFFICE O/P NEW MOD 45 MIN: CPT | Performed by: INTERNAL MEDICINE

## 2024-11-27 ASSESSMENT — PATIENT HEALTH QUESTIONNAIRE - PHQ9
2. FEELING DOWN, DEPRESSED OR HOPELESS: NOT AT ALL
SUM OF ALL RESPONSES TO PHQ9 QUESTIONS 1 AND 2: 0
1. LITTLE INTEREST OR PLEASURE IN DOING THINGS: NOT AT ALL

## 2024-11-27 ASSESSMENT — ENCOUNTER SYMPTOMS
DEPRESSION: 0
LOSS OF SENSATION IN FEET: 1
OCCASIONAL FEELINGS OF UNSTEADINESS: 1

## 2024-11-27 ASSESSMENT — PAIN SCALES - GENERAL: PAINLEVEL_OUTOF10: 0-NO PAIN

## 2024-11-27 NOTE — ASSESSMENT & PLAN NOTE
We talked about smoking cessation or reduction with the continued commitment to reducing her cigarette intake and she is committed to trying.

## 2024-11-27 NOTE — PROGRESS NOTES
Subjective      Chief Complaint   Patient presents with    cardiac evaluation     Was referred because of abnormal results on US Vascular PVD        54-year-old woman who has been evaluated by her podiatrist for surgery on her right foot for chronic arthritic changes it not require surgical intervention due to progressive pain with walking.  She had PVR studies preoperatively and was told those studies were abnormal and was referred for cardiology consult.  In this setting I explained that what she would benefit from his vascular surgical consult to see if she has arterial flow limitations that would require vascular intervention.    She has no history of previous myocardial infarction, heart failure, valvular heart disease, syncope or sustained arrhythmias.  She tells me many years ago she had an echocardiogram because someone thought she had mitral valve prolapse but her echocardiogram was unremarkable    She is angina free and her functional capacity is limited by her arthritic changes in her right foot    Her EKG today shows sinus rhythm rate 64 bpm with normal axis and intervals and a QTc of 422ms         Review of Systems   All other systems reviewed and are negative.       Objective   Physical Exam  Constitutional:       Appearance: Normal appearance.   HENT:      Head: Normocephalic and atraumatic.   Eyes:      Pupils: Pupils are equal, round, and reactive to light.   Cardiovascular:      Rate and Rhythm: Normal rate and regular rhythm.      Heart sounds: Normal heart sounds.      Comments: Bounding femoral pulses bilaterally with diminished pedal pulses.  Pulmonary:      Effort: Pulmonary effort is normal.      Breath sounds: Normal breath sounds.   Abdominal:      General: Abdomen is flat. Bowel sounds are normal.      Palpations: Abdomen is soft.   Musculoskeletal:         General: Normal range of motion.      Cervical back: Normal range of motion.   Skin:     General: Skin is warm and dry.    Neurological:      General: No focal deficit present.   Psychiatric:         Mood and Affect: Mood normal.         Judgment: Judgment normal.          Lab Review:   Not applicable    Benign essential HTN  Stable on present medication    Preoperative cardiovascular examination  From a cardiac standpoint she is stable to undergo vascular for right foot surgical intervention .  I suggested echocardiography to confirm normal left ventricular systolic function in the setting of her chronic smoking and chronic hypertension.  She can follow-up with me in 1 years time    Tobacco abuse  We talked about smoking cessation or reduction with the continued commitment to reducing her cigarette intake and she is committed to trying.    Dyslipidemia  Continue on her statin therapy and she is on high-dose rosuvastatin.  Annual lipid profiles and metabolic profiles    Primary

## 2024-11-27 NOTE — ASSESSMENT & PLAN NOTE
From a cardiac standpoint she is stable to undergo vascular for right foot surgical intervention .  I suggested echocardiography to confirm normal left ventricular systolic function in the setting of her chronic smoking and chronic hypertension.  She can follow-up with me in 1 years time

## 2024-11-27 NOTE — ASSESSMENT & PLAN NOTE
Continue on her statin therapy and she is on high-dose rosuvastatin.  Annual lipid profiles and metabolic profiles    Primary

## 2025-03-28 ENCOUNTER — PATIENT OUTREACH (OUTPATIENT)
Dept: CARE COORDINATION | Age: 55
End: 2025-03-28
Payer: COMMERCIAL

## 2025-03-31 ENCOUNTER — PATIENT OUTREACH (OUTPATIENT)
Dept: CARE COORDINATION | Age: 55
End: 2025-03-31
Payer: COMMERCIAL

## 2025-04-17 ENCOUNTER — TELEPHONE (OUTPATIENT)
Dept: PRIMARY CARE | Facility: CLINIC | Age: 55
End: 2025-04-17
Payer: COMMERCIAL

## 2025-04-17 DIAGNOSIS — E11.9 TYPE 2 DIABETES MELLITUS WITHOUT COMPLICATION, WITHOUT LONG-TERM CURRENT USE OF INSULIN: ICD-10-CM

## 2025-04-17 RX ORDER — TIRZEPATIDE 5 MG/.5ML
5 INJECTION, SOLUTION SUBCUTANEOUS
Qty: 6 ML | Refills: 3 | Status: SHIPPED | OUTPATIENT
Start: 2025-04-20 | End: 2026-03-22

## 2025-05-01 ENCOUNTER — TELEPHONE (OUTPATIENT)
Dept: PRIMARY CARE | Facility: CLINIC | Age: 55
End: 2025-05-01
Payer: COMMERCIAL

## 2025-05-01 NOTE — TELEPHONE ENCOUNTER
Ms. Shah called office today with update that her prior authorization was denied for Mounjaro 5 mg.  She's not understanding why the prior authorization denied and wasn't able to get the information of why denied.     I called [employer] and was advised the prior authorization denied as there was no documentation for A1C greater than 5.  If you want to do another prior authorization then you would have to open up a brand new prior authorization.    Please advise if you want to keep patient on this medication or change to another medication  
Ms. Shah called office with update that she spoke with [employer] Pharmacist who is part of prior authorization, advised  that they need the information from when she was first diagnosis (2020)with diabetes along with lab work.  Ms. Shah also needs prescription for Mounjaro 5mg to read life-time going forward.  
Breast cancer    GERD (gastroesophageal reflux disease)    HLD (hyperlipidemia)    HTN (hypertension)

## 2025-07-28 ENCOUNTER — HOSPITAL ENCOUNTER (EMERGENCY)
Facility: HOSPITAL | Age: 55
Discharge: HOME | End: 2025-07-28
Payer: COMMERCIAL

## 2025-07-28 ENCOUNTER — APPOINTMENT (OUTPATIENT)
Dept: RADIOLOGY | Facility: HOSPITAL | Age: 55
End: 2025-07-28
Payer: COMMERCIAL

## 2025-07-28 VITALS
HEIGHT: 67 IN | TEMPERATURE: 98.1 F | BODY MASS INDEX: 23.23 KG/M2 | RESPIRATION RATE: 16 BRPM | WEIGHT: 148 LBS | SYSTOLIC BLOOD PRESSURE: 121 MMHG | HEART RATE: 62 BPM | OXYGEN SATURATION: 96 % | DIASTOLIC BLOOD PRESSURE: 73 MMHG

## 2025-07-28 DIAGNOSIS — S09.90XA CLOSED HEAD INJURY, INITIAL ENCOUNTER: Primary | ICD-10-CM

## 2025-07-28 DIAGNOSIS — S06.0XAA CONCUSSION WITH UNKNOWN LOSS OF CONSCIOUSNESS STATUS, INITIAL ENCOUNTER: ICD-10-CM

## 2025-07-28 PROCEDURE — 99284 EMERGENCY DEPT VISIT MOD MDM: CPT | Mod: 25

## 2025-07-28 PROCEDURE — 70486 CT MAXILLOFACIAL W/O DYE: CPT

## 2025-07-28 PROCEDURE — 2500000001 HC RX 250 WO HCPCS SELF ADMINISTERED DRUGS (ALT 637 FOR MEDICARE OP)

## 2025-07-28 PROCEDURE — 72125 CT NECK SPINE W/O DYE: CPT

## 2025-07-28 PROCEDURE — 70450 CT HEAD/BRAIN W/O DYE: CPT

## 2025-07-28 PROCEDURE — 70450 CT HEAD/BRAIN W/O DYE: CPT | Performed by: RADIOLOGY

## 2025-07-28 PROCEDURE — 76377 3D RENDER W/INTRP POSTPROCES: CPT

## 2025-07-28 PROCEDURE — 72125 CT NECK SPINE W/O DYE: CPT | Performed by: RADIOLOGY

## 2025-07-28 PROCEDURE — 2500000004 HC RX 250 GENERAL PHARMACY W/ HCPCS (ALT 636 FOR OP/ED)

## 2025-07-28 RX ORDER — IBUPROFEN 600 MG/1
600 TABLET, FILM COATED ORAL ONCE
Status: COMPLETED | OUTPATIENT
Start: 2025-07-28 | End: 2025-07-28

## 2025-07-28 RX ORDER — ACETAMINOPHEN 500 MG
1000 TABLET ORAL ONCE
Status: COMPLETED | OUTPATIENT
Start: 2025-07-28 | End: 2025-07-28

## 2025-07-28 RX ORDER — ONDANSETRON 4 MG/1
4 TABLET, ORALLY DISINTEGRATING ORAL EVERY 8 HOURS PRN
Qty: 30 TABLET | Refills: 0 | Status: SHIPPED | OUTPATIENT
Start: 2025-07-28

## 2025-07-28 RX ORDER — ONDANSETRON 4 MG/1
4 TABLET, ORALLY DISINTEGRATING ORAL ONCE
Status: COMPLETED | OUTPATIENT
Start: 2025-07-28 | End: 2025-07-28

## 2025-07-28 RX ADMIN — ONDANSETRON 4 MG: 4 TABLET, ORALLY DISINTEGRATING ORAL at 17:32

## 2025-07-28 RX ADMIN — IBUPROFEN 600 MG: 600 TABLET ORAL at 17:32

## 2025-07-28 RX ADMIN — ACETAMINOPHEN 1000 MG: 500 TABLET ORAL at 17:32

## 2025-07-28 ASSESSMENT — PAIN SCALES - GENERAL
PAINLEVEL_OUTOF10: 5 - MODERATE PAIN
PAINLEVEL_OUTOF10: 7

## 2025-07-28 ASSESSMENT — PAIN DESCRIPTION - PROGRESSION: CLINICAL_PROGRESSION: NOT CHANGED

## 2025-07-28 ASSESSMENT — PAIN DESCRIPTION - PAIN TYPE: TYPE: ACUTE PAIN

## 2025-07-28 ASSESSMENT — PAIN - FUNCTIONAL ASSESSMENT: PAIN_FUNCTIONAL_ASSESSMENT: 0-10

## 2025-07-28 ASSESSMENT — PAIN DESCRIPTION - LOCATION: LOCATION: HEAD

## 2025-07-28 ASSESSMENT — PAIN DESCRIPTION - DESCRIPTORS: DESCRIPTORS: ACHING

## 2025-07-28 ASSESSMENT — PAIN DESCRIPTION - ONSET: ONSET: SUDDEN

## 2025-07-28 ASSESSMENT — PAIN DESCRIPTION - FREQUENCY: FREQUENCY: CONSTANT/CONTINUOUS

## 2025-07-28 NOTE — ED TRIAGE NOTES
Patient presents to ED from home for mechanical fall from tripping over a small stool and fell face first on concrete. Denies LOC and blood thinners but complains of head and neck pain as well as nausea and tired.

## 2025-07-28 NOTE — ED PROVIDER NOTES
HPI   Chief Complaint   Patient presents with    Fall       Patient is a 55-year-old female presents emergency department for evaluation of fall with head injury and facial pain.  Patient states that she was walking through her living room when she tripped over an ottoman.  She states this occurred just about an hour ago prior to presentation.  She states she fell forward and hit the front of her face and the right side of her face on the concrete ground.  She is unsure as to if she lost consciousness but states that if she did it was only for less than a second.  She is not currently on any blood thinners.  She is having pain and tingling to the right side of her face from the area of injury has been having some nausea.  She states that she just feels somewhat spacey and feels fatigued.  She notes that she scraped her right shoulder and scraped her right ankle when falling but has good range of motion and no significant pain at this time.  She states she is mostly concerned about her head.  She is not currently on any blood thinners.  She denies any preceding lightheadedness or dizziness and feels relatively well otherwise.      History provided by:  Patient   used: No            Patient History   Medical History[1]  Surgical History[2]  Family History[3]  Social History[4]    Physical Exam   ED Triage Vitals [07/28/25 1708]   Temperature Heart Rate Respirations BP   36.7 °C (98.1 °F) 68 18 143/76      Pulse Ox Temp Source Heart Rate Source Patient Position   98 % Temporal Monitor Sitting      BP Location FiO2 (%)     Right arm --       Physical Exam  Constitutional:       Appearance: Normal appearance.   HENT:      Head: Normocephalic.      Comments: Ecchymosis to right cheekbone and some minimal tenderness palpation of her right cheek and right nasal bone.  No obvious deformity noted.     Mouth/Throat:      Mouth: Mucous membranes are moist.      Comments: Opening and closing of jaw without  difficulty with no tenderness palpation over dentition with no loose teeth.    Eyes:      Extraocular Movements: Extraocular movements intact.      Pupils: Pupils are equal, round, and reactive to light.       Cardiovascular:      Rate and Rhythm: Normal rate and regular rhythm.   Pulmonary:      Effort: Pulmonary effort is normal.      Breath sounds: Normal breath sounds.   Abdominal:      General: Abdomen is flat.      Palpations: Abdomen is soft.      Tenderness: There is no abdominal tenderness.     Musculoskeletal:         General: No deformity. Normal range of motion.      Comments: Superficial abrasion to right shoulder with range of motion with external rotation abduction and adduction intact.  Bilateral upper extremities with equal strength.  Range of motion intact bilateral upper and lower extremities.     Skin:     General: Skin is warm and dry.     Neurological:      General: No focal deficit present.      Mental Status: She is alert and oriented to person, place, and time.           ED Course & MDM   Diagnoses as of 07/28/25 2217   Closed head injury, initial encounter   Concussion with unknown loss of consciousness status, initial encounter                 No data recorded     Jayde Coma Scale Score: 15 (07/28/25 1711 : Nancie Kimble RN)                           Medical Decision Making  Patient is a 54-year-old female presents emergency department for evaluation of fall with right-sided facial injury.    Labwork not warranted at today's visit.    Scans done today were interpreted/confirmed by radiologist and also interpreted by me which included CT head without contrast CT maxillofacial without contrast CT cervical spine without contrast.  CT scan shows no acute intracranial hemorrhage or displaced calvarial fracture with no acute fracture or dislocation and cervical spine with no acute facial bone fractures visualized.    Medications given at today's visit include p.o. Tylenol, p.o. ibuprofen, p.o.  Zofran    I saw this patient independently.  Patient remains stable while in the emergency department.  She remains neurovascularly intact.  She does have some ecchymosis to right cheekbone And some tenderness to the right forehead but otherwise no significant findings on physical exam.  CT imaging of face head and neck shows no acute facial bone fractures with no acute intracranial abnormality with no cervical spine injury.  She is signs and symptoms consistent with facial contusion as well as likely concussion related to trip and fall.  She was educated on concussion protocol and symptom management home-going.  She was educated on continue Tylenol and ibuprofen.  She will given Zofran to help with nausea.  She was educated on brain rest and to abstain from any activity that might raise her risk of reinjury of the head.  She was educated to follow-up closely with primary care provider in the following week.  Emergent pathologies were considered for this patient, although I have low suspicion for anything acutely emergent given patient's clinical presentation, history, physical exam, stable vital signs, and relatively unremarkable workup.  Discharging patient home is reasonable plan of care for outpatient management.    All labs, imaging, and diagnostic studies were reviewed by me and patient was counseled on clinical impression, expectations, and plan.  Patient was educated to follow-up with PCP in the following 1-2 days.  All questions from patient were answered. They elicited understanding and were agreeable to course of treatment.  Patient was discharged in stable condition and given strict return precautions.    Prescriptions given on discharge: P.o. Zofran    ** Disclaimer:  Parts of this document were written utilizing a voice to text dictation software.  Note may contain minor transcription or typographical errors that were inadvertently transcribed by the computer software.        Procedure  Procedures        [1]   Past Medical History:  Diagnosis Date    Bitten or stung by nonvenomous insect and other nonvenomous arthropods, initial encounter 01/28/2014    Bug bite    Cyst of Bartholin's gland 02/15/2019    Bartholin cyst    Encounter for general adult medical examination without abnormal findings     Normal female breast exam    Encounter for screening for malignant neoplasm of vagina     Vaginal Pap smear    Infectious mononucleosis, unspecified without complication 09/09/2014    EBV infection    Influenza due to unidentified influenza virus with other respiratory manifestations 12/13/2019    Influenzal acute upper respiratory infection    Lumbago with sciatica, right side 04/30/2018    Low back pain with right-sided sciatica, unspecified back pain laterality, unspecified chronicity    Other conditions influencing health status 12/18/2019    History of cough    Other mucopurulent conjunctivitis, unspecified eye 03/18/2014    Pink eye    Other specified anxiety disorders 04/30/2018    Depression with anxiety    Pain in left hip 10/24/2016    Pain of left hip joint    Pain in right elbow 10/24/2016    Arthralgia of both elbows    Pain in right foot 03/28/2016    Pain of both heels    Pain in right hip 10/13/2015    Hip pain, bilateral    Pain in right wrist 10/24/2016    Arthralgia of right wrist    Pain in unspecified ankle and joints of unspecified foot 10/24/2016    Arthralgia of ankle, unspecified laterality    Pain, unspecified 03/09/2020    Body aches    Personal history of other diseases of the circulatory system     History of hypertension    Personal history of other diseases of the digestive system     History of irritable bowel syndrome    Personal history of other diseases of the musculoskeletal system and connective tissue 05/07/2018    History of low back pain    Personal history of other diseases of the respiratory system 03/14/2016    History of sinusitis    Personal history of other diseases of the  respiratory system 07/18/2016    History of streptococcal pharyngitis    Personal history of other diseases of the respiratory system 07/18/2016    History of sore throat    Personal history of other diseases of the respiratory system 02/12/2014    History of acute sinusitis    Personal history of other diseases of the respiratory system 12/18/2019    History of acute bronchitis    Personal history of other diseases of the respiratory system     Personal history of sinusitis    Personal history of other diseases of the respiratory system 12/05/2019    History of acute bacterial sinusitis    Personal history of other endocrine, nutritional and metabolic disease 06/28/2017    History of hyperkalemia    Personal history of other infectious and parasitic diseases 10/24/2016    History of viral infection    Personal history of other infectious and parasitic diseases 10/24/2016    History of viral infection    Personal history of other medical treatment 06/17/2015    History of screening mammography    Personal history of other mental and behavioral disorders 03/09/2020    History of anxiety disorder    Personal history of other specified conditions 03/09/2020    History of insomnia    Personal history of other specified conditions 03/09/2020    History of paresthesia    Personal history of other specified conditions 12/05/2019    History of fatigue    Personal history of other specified conditions 04/25/2016    History of fatigue    Personal history of other specified conditions 10/24/2016    History of urinary frequency    Personal history of other specified conditions 09/09/2014    History of urinary frequency    Postnasal drip 09/09/2014    Post-nasal drip    Pruritus, unspecified 02/06/2015    Itching    Sprain of calcaneofibular ligament of unspecified ankle, initial encounter 03/14/2016    Sprain and strain of calcaneofibular (ligament)    Syncope and collapse 08/22/2016    Near syncope    Toxic effect of  unspecified spider venom, accidental (unintentional), initial encounter 05/31/2016    Spider bite    Unspecified asthma with (acute) exacerbation ([facility]) 02/12/2014    Asthma flare    Urinary tract infection, site not specified 09/09/2014    UTI (lower urinary tract infection)    Vitamin deficiency, unspecified 04/25/2016    Vitamin deficiency   [2]   Past Surgical History:  Procedure Laterality Date    HAND SURGERY  01/24/2014    Hand Surgery                                                                                                                                                         [3]   Family History  Problem Relation Name Age of Onset    Stroke Mother      Coronary artery disease Father      Hypertension Sister     [4]   Social History  Tobacco Use    Smoking status: Every Day     Current packs/day: 0.50     Average packs/day: 0.5 packs/day for 36.6 years (18.3 ttl pk-yrs)     Types: Cigarettes     Start date: 1989     Passive exposure: Never    Smokeless tobacco: Never   Vaping Use    Vaping status: Never Used   Substance Use Topics    Alcohol use: Never    Drug use: Never        Lori Hull PA-C  07/28/25 [id number]

## 2025-07-28 NOTE — DISCHARGE INSTRUCTIONS
Please follow-up closely with your primary care provider in the following week.  Continue Tylenol and ibuprofen as needed for aches and pains.  Avoid any rigorous activity to prevent any reinjury of the head.  Take time and rest to help with symptom improvement.

## 2025-09-30 ENCOUNTER — APPOINTMENT (OUTPATIENT)
Dept: PRIMARY CARE | Facility: CLINIC | Age: 55
End: 2025-09-30
Payer: COMMERCIAL